# Patient Record
Sex: MALE | Race: WHITE | NOT HISPANIC OR LATINO | Employment: OTHER | URBAN - METROPOLITAN AREA
[De-identification: names, ages, dates, MRNs, and addresses within clinical notes are randomized per-mention and may not be internally consistent; named-entity substitution may affect disease eponyms.]

---

## 2022-12-22 PROBLEM — Z31.69 PROCREATIVE MANAGEMENT COUNSELING: Status: ACTIVE | Noted: 2022-12-22

## 2022-12-22 PROBLEM — R35.1 NOCTURIA ASSOCIATED WITH BENIGN PROSTATIC HYPERPLASIA: Status: ACTIVE | Noted: 2022-12-22

## 2022-12-22 PROBLEM — E29.1 HYPOGONADISM IN MALE: Status: ACTIVE | Noted: 2022-12-22

## 2022-12-22 PROBLEM — N40.1 NOCTURIA ASSOCIATED WITH BENIGN PROSTATIC HYPERPLASIA: Status: ACTIVE | Noted: 2022-12-22

## 2024-03-17 PROBLEM — F11.90 OPIOID USE DISORDER: Status: ACTIVE | Noted: 2024-03-17

## 2024-03-17 PROBLEM — F90.9 ADHD: Status: ACTIVE | Noted: 2024-03-17
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
## 2024-03-17 PROBLEM — S52.92XA LEFT FOREARM FRACTURE: Status: ACTIVE | Noted: 2024-03-17

## 2024-03-18 PROBLEM — S52.252A CLOSED DISPLACED COMMINUTED FRACTURE OF SHAFT OF LEFT ULNA: Status: ACTIVE | Noted: 2024-03-18

## 2024-03-18 PROBLEM — S52.042A: Status: ACTIVE | Noted: 2024-03-18

## 2024-03-18 PROBLEM — S52.352A CLOSED DISPLACED COMMINUTED FRACTURE OF SHAFT OF LEFT RADIUS: Status: ACTIVE | Noted: 2024-03-18

## 2024-03-18 PROBLEM — S42.402A: Status: ACTIVE | Noted: 2024-03-18

## 2024-03-18 PROBLEM — Z01.818 PRE-OP EVALUATION: Status: ACTIVE | Noted: 2024-03-18

## 2024-03-19 PROBLEM — S52.202A FOREARM FRACTURES, BOTH BONES, CLOSED, LEFT, INITIAL ENCOUNTER: Status: ACTIVE | Noted: 2024-03-17

## 2024-03-23 ENCOUNTER — NURSE TRIAGE (OUTPATIENT)
Dept: ADMINISTRATIVE | Facility: CLINIC | Age: 39
End: 2024-03-23

## 2024-03-23 DIAGNOSIS — S52.352A CLOSED DISPLACED COMMINUTED FRACTURE OF SHAFT OF LEFT RADIUS, INITIAL ENCOUNTER: Primary | ICD-10-CM

## 2024-03-23 RX ORDER — HYDROCODONE BITARTRATE AND ACETAMINOPHEN 5; 325 MG/1; MG/1
1 TABLET ORAL EVERY 8 HOURS PRN
Qty: 15 TABLET | Refills: 0 | Status: SHIPPED | OUTPATIENT
Start: 2024-03-23 | End: 2024-03-23 | Stop reason: SDUPTHER

## 2024-03-23 RX ORDER — KETOROLAC TROMETHAMINE 10 MG/1
10 TABLET, FILM COATED ORAL 3 TIMES DAILY PRN
Qty: 15 TABLET | Refills: 0 | Status: SHIPPED | OUTPATIENT
Start: 2024-03-23 | End: 2024-03-28

## 2024-03-23 NOTE — TELEPHONE ENCOUNTER
Pt had ORIF on 3/17. Was discharged Tuesday and was given 5 days worth of pain medication. Pt reports continued pain and needs refill. Pt has only one pill left. Pt reports pain is 8/10 and pain medication does help. Preferred pharmacy is [facility] on [facility] and [address].     Dispo is to call PCP now. Call to answering service for [facility] and spoke with Dr. Pelaez. He stated he will call patient and assist him with pain medication refill. Notified patient and gave care advice. Pt verbalized understanding.     Reason for Disposition   [1] Caller has URGENT question AND [2] triager unable to answer question    Additional Information   Negative: Sounds like a life-threatening emergency to the triager   Negative: [1] Widespread rash AND [2] bright red, sunburn-like   Negative: [1] SEVERE headache AND [2] after spinal (epidural) anesthesia   Negative: [1] Vomiting AND [2] persists > 4 hours   Negative: [1] Vomiting AND [2] abdomen looks much more swollen than usual   Negative: [1] Drinking very little AND [2] dehydration suspected (e.g., no urine > 12 hours, very dry mouth, very lightheaded)   Negative: Patient sounds very sick or weak to the triager   Negative: Sounds like a serious complication to the triager   Negative: Fever > 100.4 F (38.0 C)   Negative: [1] SEVERE post-op pain (e.g., excruciating, pain scale 8-10) AND [2] not controlled with pain medications    Protocols used: Post-Op Symptoms and Ceqdimuzy-I-YD

## 2024-03-23 NOTE — TELEPHONE ENCOUNTER
[place]    PCP:  [facility] Provider    S/P Lt Radius and Ulna Fracture ORIF.  He needs refills for Toradol, Norco, and Advil refills sent to [facility] at [address].  Per protocol, care advised is  now.  NT spoke with Dr. Janet OCP for Ortho.  OCP will send refills to pharmacy of pts choice.  NT contacted Dr. Janet OCP, again to notify Provider that prescription was printed instead of e-scribed.  OCP to e-scribe [place] over to the pharmacy.  Pt notified meds sent to the pharmacy.  Pt VU.  Advised to call for worsening/questions/concerns.  VU.    Reason for Disposition   [1] Pharmacy calling with prescription questions AND [2] triager unable to answer question    Additional Information   Negative: [1] Prescription refill request for ESSENTIAL medicine (i.e., likelihood of harm to patient if not taken) AND [2] triager unable to refill per department policy   Negative: [1] Prescription not at pharmacy AND [2] was prescribed by PCP recently  (Exception: Triager has access to EMR and prescription is recorded there. Go to Home Care and confirm for pharmacy.)    Protocols used: Medication Refill and Renewal Call-A-